# Patient Record
Sex: FEMALE | Race: WHITE | NOT HISPANIC OR LATINO | URBAN - METROPOLITAN AREA
[De-identification: names, ages, dates, MRNs, and addresses within clinical notes are randomized per-mention and may not be internally consistent; named-entity substitution may affect disease eponyms.]

---

## 2023-12-04 PROCEDURE — 87086 URINE CULTURE/COLONY COUNT: CPT

## 2023-12-04 PROCEDURE — 87186 SC STD MICRODIL/AGAR DIL: CPT

## 2023-12-05 ENCOUNTER — LAB REQUISITION (OUTPATIENT)
Dept: LAB | Facility: HOSPITAL | Age: 48
End: 2023-12-05
Payer: COMMERCIAL

## 2023-12-05 DIAGNOSIS — R39.198 OTHER DIFFICULTIES WITH MICTURITION: ICD-10-CM

## 2023-12-07 LAB — BACTERIA UR CULT: ABNORMAL

## 2024-02-23 DIAGNOSIS — Z76.0 MEDICATION REFILL: ICD-10-CM

## 2024-02-23 RX ORDER — HYDROXYZINE HYDROCHLORIDE 50 MG/1
50 TABLET, FILM COATED ORAL EVERY 12 HOURS PRN
COMMUNITY
End: 2024-02-23 | Stop reason: SDUPTHER

## 2024-02-23 RX ORDER — ESCITALOPRAM OXALATE 20 MG/1
20 TABLET ORAL DAILY
Qty: 90 TABLET | Refills: 1 | Status: SHIPPED | OUTPATIENT
Start: 2024-02-23 | End: 2024-02-29 | Stop reason: SDUPTHER

## 2024-02-23 RX ORDER — ALBUTEROL SULFATE 90 UG/1
2 AEROSOL, METERED RESPIRATORY (INHALATION) EVERY 4 HOURS PRN
COMMUNITY
End: 2024-02-23 | Stop reason: SDUPTHER

## 2024-02-23 RX ORDER — HYDROXYZINE HYDROCHLORIDE 50 MG/1
50 TABLET, FILM COATED ORAL EVERY 12 HOURS PRN
Qty: 90 TABLET | Refills: 1 | Status: SHIPPED | OUTPATIENT
Start: 2024-02-23 | End: 2024-02-29 | Stop reason: SDUPTHER

## 2024-02-23 RX ORDER — ALBUTEROL SULFATE 90 UG/1
2 AEROSOL, METERED RESPIRATORY (INHALATION) EVERY 4 HOURS PRN
Qty: 18 G | Refills: 2 | Status: SHIPPED | OUTPATIENT
Start: 2024-02-23 | End: 2024-02-29 | Stop reason: SDUPTHER

## 2024-02-23 RX ORDER — ESCITALOPRAM OXALATE 20 MG/1
20 TABLET ORAL DAILY
COMMUNITY
End: 2024-02-23 | Stop reason: SDUPTHER

## 2024-02-29 DIAGNOSIS — Z76.0 MEDICATION REFILL: ICD-10-CM

## 2024-02-29 RX ORDER — ALBUTEROL SULFATE 90 UG/1
2 AEROSOL, METERED RESPIRATORY (INHALATION) EVERY 4 HOURS PRN
Qty: 18 G | Refills: 2 | Status: SHIPPED | OUTPATIENT
Start: 2024-02-29 | End: 2024-03-01 | Stop reason: SDUPTHER

## 2024-02-29 RX ORDER — ESCITALOPRAM OXALATE 20 MG/1
20 TABLET ORAL DAILY
Qty: 90 TABLET | Refills: 1 | Status: SHIPPED | OUTPATIENT
Start: 2024-02-29 | End: 2024-03-01 | Stop reason: SDUPTHER

## 2024-02-29 RX ORDER — HYDROXYZINE HYDROCHLORIDE 50 MG/1
50 TABLET, FILM COATED ORAL EVERY 12 HOURS PRN
Qty: 90 TABLET | Refills: 1 | Status: SHIPPED | OUTPATIENT
Start: 2024-02-29 | End: 2024-03-01 | Stop reason: SDUPTHER

## 2024-02-29 NOTE — PROGRESS NOTES
This is a 48 year old female for EVAL of EMESIS and ABDO PAIN set up as TELE visit AND BEING TRIAGED BY STAFF NOW (day before) TO BE SEEN OR SENT TO ER  Phone call to patient to verify status of visit at 8:04 am unsuccessful    DENIES CP AND DENIES SOB    Phone CALL SUCCESSFUL AT 9:00 AM APPROX    SHE INDICATES SHE HAS ACID REFLUX AFTER DRINKING COFFEE SHE HAS EMESIS AND IS RECOMMENDED TO SCHEDULE AN IN OFFICE VISIT TO EXAMINE THE ABDOMENT      DENIES PREGNANCY AND STATES IN MENOPAUSE SINCE 2019      NO FEVER  NO BRBPR   NO MELENA     POS SMOKING HX 1/2 PPD AND URGED TO CEASE  POS FOR ETOH ADMITS TO DAILY USE OF BEER  NO USE OF ADVIL/ASA    She NEEDS AN IN OFFICE EVALUATION NEXT WEEK WEDNES OR FRIDAY AND WE HAVE ORDERED PRELIMINARY LABS

## 2024-02-29 NOTE — TELEPHONE ENCOUNTER
PT requesting refill on RX hydroxyzine, escitalopram, albuterol. Please send to [facility] in [place]

## 2024-03-01 ENCOUNTER — TELEMEDICINE (OUTPATIENT)
Dept: PRIMARY CARE | Facility: CLINIC | Age: 49
End: 2024-03-01
Payer: COMMERCIAL

## 2024-03-01 DIAGNOSIS — Z76.0 MEDICATION REFILL: ICD-10-CM

## 2024-03-01 DIAGNOSIS — K21.9 GASTROESOPHAGEAL REFLUX DISEASE, UNSPECIFIED WHETHER ESOPHAGITIS PRESENT: ICD-10-CM

## 2024-03-01 DIAGNOSIS — Z78.9 ALCOHOL USE: ICD-10-CM

## 2024-03-01 DIAGNOSIS — R10.9 ABDOMINAL PAIN, UNSPECIFIED ABDOMINAL LOCATION: ICD-10-CM

## 2024-03-01 DIAGNOSIS — Z12.12 SCREENING FOR COLORECTAL CANCER: ICD-10-CM

## 2024-03-01 DIAGNOSIS — R11.2 NAUSEA AND VOMITING, UNSPECIFIED VOMITING TYPE: Primary | ICD-10-CM

## 2024-03-01 DIAGNOSIS — K29.70 GASTRITIS WITHOUT BLEEDING, UNSPECIFIED CHRONICITY, UNSPECIFIED GASTRITIS TYPE: ICD-10-CM

## 2024-03-01 DIAGNOSIS — Z12.11 SCREENING FOR COLORECTAL CANCER: ICD-10-CM

## 2024-03-01 DIAGNOSIS — F17.200 TOBACCO USE DISORDER: ICD-10-CM

## 2024-03-01 PROCEDURE — 99442 PR PHYS/QHP TELEPHONE EVALUATION 11-20 MIN: CPT | Performed by: FAMILY MEDICINE

## 2024-03-01 RX ORDER — ALBUTEROL SULFATE 90 UG/1
2 AEROSOL, METERED RESPIRATORY (INHALATION) EVERY 4 HOURS PRN
Qty: 18 G | Refills: 2 | Status: SHIPPED | OUTPATIENT
Start: 2024-03-01 | End: 2024-03-06 | Stop reason: SDUPTHER

## 2024-03-01 RX ORDER — HYDROXYZINE HYDROCHLORIDE 50 MG/1
50 TABLET, FILM COATED ORAL EVERY 12 HOURS PRN
Qty: 90 TABLET | Refills: 1 | Status: SHIPPED | OUTPATIENT
Start: 2024-03-01 | End: 2024-03-06 | Stop reason: SDUPTHER

## 2024-03-01 RX ORDER — ESCITALOPRAM OXALATE 20 MG/1
20 TABLET ORAL DAILY
Qty: 90 TABLET | Refills: 1 | Status: SHIPPED | OUTPATIENT
Start: 2024-03-01 | End: 2024-03-18

## 2024-03-01 RX ORDER — ESOMEPRAZOLE MAGNESIUM 40 MG/1
40 CAPSULE, DELAYED RELEASE ORAL 2 TIMES DAILY
Qty: 60 CAPSULE | Refills: 1 | Status: SHIPPED | OUTPATIENT
Start: 2024-03-01 | End: 2024-04-30

## 2024-03-03 NOTE — PROGRESS NOTES
This is a 48 year old female who had a BRIEF TELE VISIT and determined that IN HOUSE OFFICE VISIT is needed for ABDO EXAM and further evaluation for N V and abdominal discomfort last week and called off work and duration of Sxs lasted 5 days and now entirely improved.    PAIN in ABDO was DURING EMESIS and on MUSCLE STRUCTURE of ABDo on LEfT SIDE     NO STOOL ISSUES just emesis   NO BRBPR   DO DIARRHEA        ROS is NEG for HEADACHE,  DIARRHEA, CHEST PAIN, SOB, and BLEEDING and as further REVIEWED BELOW.    Telly Flor is a 48 y.o. female who presents for Follow-up (Follow up on tele ).    HPI:    See above    Review of systems is essentially negative for all systems except for any identified issues in HPI above.    Objective     /72   Pulse 70   Temp 36.3 °C (97.4 °F)   SpO2 98%      Physical Examination:       GENERAL           General Appearance: well-appearing, well-developed, well-hydrated, well-nourished, no acute distress.        HEENT           NECK supple, no masses or thyromegaly, no carotid bruit.        EYES           Extraocular Movements: normal, bilateral eyes KWAME, no conjunctival injection.        HEART           Rate and Rhythm regular rate and rhythm. Heart sounds: normal S1S2, no S3 or S4. Murmurs: none.        CHEST           Breath sounds: Clear to IPPA, RR<16 no use of accessory muscles.        ABDOMEN           General: Neg for LKKS or masses, no scleral icterus or jaundice.        MUSCULOSKELETAL           Joints Demonstration: Neg for erythema, swelling or joint deformities. gross abnormalities no gross abnormalities.        EXTREMITIES           Lower Extremities: Neg for cyanosis, clubbing or edema.       Assessment/Plan   ENCOURAGED TO SCHEDULE ROUTINE MEDICAL    URGED TO CEASE SMOKING and CEASE ETOH use  She has now resumed work  CARE GAPS are added to labs for HIV and HEP C  She is deferring PNEUMONIA VAX at this time    Problem List Items Addressed This Visit     None  Visit Diagnoses       Nausea and vomiting, unspecified vomiting type    -  Primary    Abdominal pain, unspecified abdominal location        Gastroesophageal reflux disease, unspecified whether esophagitis present        Tobacco use disorder        Alcohol use        Screening for colorectal cancer        Gastritis without bleeding, unspecified chronicity, unspecified gastritis type                FOLLOW UP:  PRN and as specified above         Cristina Ledezma M.D.

## 2024-03-06 ENCOUNTER — OFFICE VISIT (OUTPATIENT)
Dept: PRIMARY CARE | Facility: CLINIC | Age: 49
End: 2024-03-06
Payer: COMMERCIAL

## 2024-03-06 VITALS
HEART RATE: 70 BPM | DIASTOLIC BLOOD PRESSURE: 72 MMHG | SYSTOLIC BLOOD PRESSURE: 122 MMHG | OXYGEN SATURATION: 98 % | TEMPERATURE: 97.4 F

## 2024-03-06 DIAGNOSIS — Z12.39 BREAST SCREENING: ICD-10-CM

## 2024-03-06 DIAGNOSIS — R11.2 NAUSEA AND VOMITING, UNSPECIFIED VOMITING TYPE: Primary | ICD-10-CM

## 2024-03-06 DIAGNOSIS — Z76.0 MEDICATION REFILL: ICD-10-CM

## 2024-03-06 DIAGNOSIS — K21.9 GASTROESOPHAGEAL REFLUX DISEASE, UNSPECIFIED WHETHER ESOPHAGITIS PRESENT: ICD-10-CM

## 2024-03-06 DIAGNOSIS — R10.9 ABDOMINAL PAIN, UNSPECIFIED ABDOMINAL LOCATION: ICD-10-CM

## 2024-03-06 DIAGNOSIS — Z12.12 SCREENING FOR COLORECTAL CANCER: ICD-10-CM

## 2024-03-06 DIAGNOSIS — Z13.9 SCREENING DUE: ICD-10-CM

## 2024-03-06 DIAGNOSIS — K29.70 GASTRITIS WITHOUT BLEEDING, UNSPECIFIED CHRONICITY, UNSPECIFIED GASTRITIS TYPE: ICD-10-CM

## 2024-03-06 DIAGNOSIS — Z12.11 SCREENING FOR COLORECTAL CANCER: ICD-10-CM

## 2024-03-06 DIAGNOSIS — Z78.9 ALCOHOL USE: ICD-10-CM

## 2024-03-06 DIAGNOSIS — F17.200 TOBACCO USE DISORDER: ICD-10-CM

## 2024-03-06 PROBLEM — I10 PRIMARY HYPERTENSION: Status: ACTIVE | Noted: 2024-03-06

## 2024-03-06 PROBLEM — N92.6 IRREGULAR PERIODS: Status: ACTIVE | Noted: 2024-03-06

## 2024-03-06 PROBLEM — E78.49 OTHER HYPERLIPIDEMIA: Status: ACTIVE | Noted: 2024-03-06

## 2024-03-06 PROBLEM — J44.9 CHRONIC OBSTRUCTIVE PULMONARY DISEASE (MULTI): Status: ACTIVE | Noted: 2024-03-06

## 2024-03-06 PROBLEM — F41.9 ANXIETY: Status: ACTIVE | Noted: 2024-03-06

## 2024-03-06 PROCEDURE — 3078F DIAST BP <80 MM HG: CPT | Performed by: FAMILY MEDICINE

## 2024-03-06 PROCEDURE — 3074F SYST BP LT 130 MM HG: CPT | Performed by: FAMILY MEDICINE

## 2024-03-06 PROCEDURE — 99214 OFFICE O/P EST MOD 30 MIN: CPT | Performed by: FAMILY MEDICINE

## 2024-03-06 RX ORDER — HYDROXYZINE HYDROCHLORIDE 50 MG/1
50 TABLET, FILM COATED ORAL EVERY 12 HOURS PRN
Qty: 90 TABLET | Refills: 1 | Status: SHIPPED | OUTPATIENT
Start: 2024-03-06

## 2024-03-06 RX ORDER — ALBUTEROL SULFATE 90 UG/1
2 AEROSOL, METERED RESPIRATORY (INHALATION) EVERY 4 HOURS PRN
Qty: 18 G | Refills: 2 | Status: SHIPPED | OUTPATIENT
Start: 2024-03-06

## 2024-03-06 ASSESSMENT — PATIENT HEALTH QUESTIONNAIRE - PHQ9
2. FEELING DOWN, DEPRESSED OR HOPELESS: NOT AT ALL
1. LITTLE INTEREST OR PLEASURE IN DOING THINGS: NOT AT ALL
SUM OF ALL RESPONSES TO PHQ9 QUESTIONS 1 AND 2: 0

## 2024-03-06 ASSESSMENT — PAIN SCALES - GENERAL: PAINLEVEL: 0-NO PAIN

## 2024-03-08 NOTE — PROGRESS NOTES
"  This is a 48 year old female for ROUTINE MEDICAL and she was PREVIOUSLY REFERRED TO GI for BOTH UPPER and LOWER SCOPES but absolutely declines and is not able to be talked into this despite awareness pf RISKS of UPPER GI and LOWER GI MALIGNANCIES  but she AGREES to have COLOGARD in any case.     URGED TO MAKE and KEEP the GI visit for UGI issues.      ROS is NEG for HEADACHE, NAUSEA, VOMITING, DIARRHEA, CHEST PAIN, SOB, and BLEEDING and as further REVIEWED BELOW.    Subjective   Charlie Flor is a 48 y.o. female who presents for Annual Exam.    HPI:    See above      Review of systems is essentially negative for all systems except for any identified issues in HPI above.    Objective     /70   Pulse 69   Temp 36.2 °C (97.2 °F)   Ht 1.702 m (5' 7\")   Wt 77.6 kg (171 lb)   SpO2 98%   BMI 26.78 kg/m²      COMPLETE PHYSICAL EXAM    GENERAL           General Appearance: pleasant, well-appearing, well-developed, well-hydrated, well-nourished, well-groomed, .        HEENT           NECK supple, Neg for adneopathy no thyroid enlargement or nodules, Oropharynx normal no exudates.        EYES           Pupils: PERRLA, no photophobia.        HEART           Rate and Rhythm regular rate and rhythm. Heart sounds: normal S1S2. Murmurs: none.        LUNGS           Effort: Normal chest wall, no pectus, Normal air entry all fields, SCATTERERED RHONCHI, RR<16 with no use of accessory muscles.        BREASTS           Bilaterally: no masses, no nipple retraction, no nipple discharge, no abnormal skin changes. Axilla: no lymphadenopathy.        BACK           General: unremarkable, no spinal tenderness or rashes.        ABDOMEN           General: Normal to inspection, neg for LKKS or masses and BSs heard in all quadrants           RECTAL is negative for masses and HEME NEGATIVE.        LYMPHATICS           Cervical: none. Axillary: none.        MUSCULOSKELETAL           gross abnormalities no gross abnormalities, no joint " redness or swelling.        EXTREMITIES           Varicose veins: not present. Pulses: 2+ bilateral. Clubbing: none. Cyanosis: no.        NEUROLOGICAL           Orientation: alert and oriented x 3. Grossly normal: yes. Plantars: downgoing bilaterally. Muscle Bulk: normal . Cranial Nerves: CN's II-XII grossly intact.        PSYCHOLOGY           Affect: appropriate. Mood: pleasant.     Assessment/Plan   COUNSELED on the wisdom of SMOKING CESSATION but not yet ready to do so.  Problem List Items Addressed This Visit    None  Visit Diagnoses       Routine medical exam    -  Primary    Relevant Orders    CBC    Comprehensive metabolic panel    Microscopic Only, Urine    Screening mammogram for breast cancer        Relevant Orders    BI mammo bilateral screening tomosynthesis    Screening for colorectal cancer        Screening, lipid        Relevant Orders    Lipid panel    Screening due        Health counseling        Immunization counseling        Tobacco use disorder        Alcohol use        Gastroesophageal reflux disease, unspecified whether esophagitis present        Gastritis without bleeding, unspecified chronicity, unspecified gastritis type        Nausea and vomiting, unspecified vomiting type                FOLLOW UP:   YEARLY for ROUTINE MEDICAL and PRN for other issues as discussed in visit         Cristina Ledezma M.D.

## 2024-03-17 DIAGNOSIS — Z76.0 MEDICATION REFILL: ICD-10-CM

## 2024-03-18 ENCOUNTER — TELEPHONE (OUTPATIENT)
Dept: PRIMARY CARE | Facility: CLINIC | Age: 49
End: 2024-03-18
Payer: COMMERCIAL

## 2024-03-18 RX ORDER — ESCITALOPRAM OXALATE 20 MG/1
20 TABLET ORAL DAILY
Qty: 90 TABLET | Refills: 0 | Status: SHIPPED | OUTPATIENT
Start: 2024-03-18

## 2024-03-18 NOTE — PROGRESS NOTES
This is a 48 year old female for FU for REVIEW of CONDITIONS and [facility] CURRENTLY OFF WORK for STOMACH ISSUES and has numerous referrals.  SHE RETURNED TO WORK March 2nd.    CURRENTLY NO ABDO PAIN set to see GI    Had been encouraged to QUIT SMOKING and cease ETOH  Was referred to GI      SHE RETURNS this week Friday for ROUTINE MEDICAL      ROS is NEG for HEADACHE, NAUSEA, VOMITING, DIARRHEA, CHEST PAIN, SOB, and BLEEDING and as further REVIEWED BELOW.    Telly Flor is a 48 y.o. female who presents for Follow-up ([facility] paper work).    HPI:    Per nursing intake, pt here for Follow-up ([facility] paper work)       Review of systems is essentially negative for all systems except for any identified issues in HPI above.    Objective     /80   Pulse 64   Temp 36.3 °C (97.4 °F) (Temporal)   Wt 67.8 kg (149 lb 6.4 oz)   SpO2 99%   BMI 23.40 kg/m²      Physical Examination:       GENERAL           General Appearance: well-appearing, well-developed, well-hydrated, well-nourished, no acute distress.        HEENT           NECK supple, no masses or thyromegaly, no carotid bruit.        EYES           Extraocular Movements: normal, bilateral eyes KWAME, no conjunctival injection.        HEART           Rate and Rhythm regular rate and rhythm. Heart sounds: normal S1S2, no S3 or S4. Murmurs: none.        CHEST           Breath sounds: Clear to IPPA, RR<16 no use of accessory muscles.        ABDOMEN           General: Neg for LKKS or masses, no scleral icterus or jaundice.        MUSCULOSKELETAL           Joints Demonstration: Neg for erythema, swelling or joint deformities. gross abnormalities no gross abnormalities.        EXTREMITIES           Lower Extremities: Neg for cyanosis, clubbing or edema.       Assessment/Plan   Problem List Items Addressed This Visit    None  Visit Diagnoses       Gastritis without bleeding, unspecified chronicity, unspecified gastritis type    -  Primary    Alcohol use         Abdominal pain, unspecified abdominal location        Gastroesophageal reflux disease, unspecified whether esophagitis present                FOLLOW UP:  PRN and as specified above         Cristina Ledezma M.D.

## 2024-03-20 ENCOUNTER — OFFICE VISIT (OUTPATIENT)
Dept: PRIMARY CARE | Facility: CLINIC | Age: 49
End: 2024-03-20
Payer: COMMERCIAL

## 2024-03-20 VITALS
HEART RATE: 64 BPM | TEMPERATURE: 97.4 F | DIASTOLIC BLOOD PRESSURE: 80 MMHG | WEIGHT: 149.4 LBS | OXYGEN SATURATION: 99 % | SYSTOLIC BLOOD PRESSURE: 112 MMHG | BODY MASS INDEX: 23.4 KG/M2

## 2024-03-20 DIAGNOSIS — K21.9 GASTROESOPHAGEAL REFLUX DISEASE, UNSPECIFIED WHETHER ESOPHAGITIS PRESENT: ICD-10-CM

## 2024-03-20 DIAGNOSIS — K29.70 GASTRITIS WITHOUT BLEEDING, UNSPECIFIED CHRONICITY, UNSPECIFIED GASTRITIS TYPE: Primary | ICD-10-CM

## 2024-03-20 DIAGNOSIS — Z78.9 ALCOHOL USE: ICD-10-CM

## 2024-03-20 DIAGNOSIS — R10.9 ABDOMINAL PAIN, UNSPECIFIED ABDOMINAL LOCATION: ICD-10-CM

## 2024-03-20 PROCEDURE — 3074F SYST BP LT 130 MM HG: CPT | Performed by: FAMILY MEDICINE

## 2024-03-20 PROCEDURE — 3079F DIAST BP 80-89 MM HG: CPT | Performed by: FAMILY MEDICINE

## 2024-03-20 PROCEDURE — 99213 OFFICE O/P EST LOW 20 MIN: CPT | Performed by: FAMILY MEDICINE

## 2024-03-20 ASSESSMENT — PATIENT HEALTH QUESTIONNAIRE - PHQ9
1. LITTLE INTEREST OR PLEASURE IN DOING THINGS: NOT AT ALL
2. FEELING DOWN, DEPRESSED OR HOPELESS: NOT AT ALL
SUM OF ALL RESPONSES TO PHQ9 QUESTIONS 1 AND 2: 0

## 2024-03-22 ENCOUNTER — OFFICE VISIT (OUTPATIENT)
Dept: PRIMARY CARE | Facility: CLINIC | Age: 49
End: 2024-03-22
Payer: COMMERCIAL

## 2024-03-22 VITALS
OXYGEN SATURATION: 98 % | HEIGHT: 67 IN | SYSTOLIC BLOOD PRESSURE: 118 MMHG | WEIGHT: 171 LBS | DIASTOLIC BLOOD PRESSURE: 70 MMHG | HEART RATE: 69 BPM | BODY MASS INDEX: 26.84 KG/M2 | TEMPERATURE: 97.2 F

## 2024-03-22 DIAGNOSIS — Z13.9 SCREENING DUE: ICD-10-CM

## 2024-03-22 DIAGNOSIS — Z78.9 ALCOHOL USE: ICD-10-CM

## 2024-03-22 DIAGNOSIS — R11.2 NAUSEA AND VOMITING, UNSPECIFIED VOMITING TYPE: ICD-10-CM

## 2024-03-22 DIAGNOSIS — R09.89 RHONCHI: ICD-10-CM

## 2024-03-22 DIAGNOSIS — Z12.12 SCREENING FOR COLORECTAL CANCER: ICD-10-CM

## 2024-03-22 DIAGNOSIS — K21.9 GASTROESOPHAGEAL REFLUX DISEASE, UNSPECIFIED WHETHER ESOPHAGITIS PRESENT: ICD-10-CM

## 2024-03-22 DIAGNOSIS — Z00.00 ROUTINE MEDICAL EXAM: Primary | ICD-10-CM

## 2024-03-22 DIAGNOSIS — K29.70 GASTRITIS WITHOUT BLEEDING, UNSPECIFIED CHRONICITY, UNSPECIFIED GASTRITIS TYPE: ICD-10-CM

## 2024-03-22 DIAGNOSIS — Z71.9 HEALTH COUNSELING: ICD-10-CM

## 2024-03-22 DIAGNOSIS — Z13.220 SCREENING, LIPID: ICD-10-CM

## 2024-03-22 DIAGNOSIS — Z12.31 SCREENING MAMMOGRAM FOR BREAST CANCER: ICD-10-CM

## 2024-03-22 DIAGNOSIS — Z71.85 IMMUNIZATION COUNSELING: ICD-10-CM

## 2024-03-22 DIAGNOSIS — Z12.11 SCREENING FOR COLORECTAL CANCER: ICD-10-CM

## 2024-03-22 DIAGNOSIS — F17.200 TOBACCO USE DISORDER: ICD-10-CM

## 2024-03-22 PROCEDURE — 3078F DIAST BP <80 MM HG: CPT | Performed by: FAMILY MEDICINE

## 2024-03-22 PROCEDURE — 99396 PREV VISIT EST AGE 40-64: CPT | Performed by: FAMILY MEDICINE

## 2024-03-22 PROCEDURE — 3074F SYST BP LT 130 MM HG: CPT | Performed by: FAMILY MEDICINE

## 2024-03-22 RX ORDER — ALBUTEROL SULFATE 0.83 MG/ML
2.5 SOLUTION RESPIRATORY (INHALATION)
COMMUNITY

## 2024-03-22 ASSESSMENT — PAIN SCALES - GENERAL: PAINLEVEL: 0-NO PAIN

## 2024-03-27 ENCOUNTER — TELEPHONE (OUTPATIENT)
Dept: PRIMARY CARE | Facility: CLINIC | Age: 49
End: 2024-03-27
Payer: COMMERCIAL

## 2024-03-27 NOTE — TELEPHONE ENCOUNTER
Pt states [place] paperwork was not signed on page 3, pt states it needs to be completed today as it is already overdue, please call patient at [phone], ok to leave message.

## 2024-03-27 NOTE — TELEPHONE ENCOUNTER
PT calling back to check the status of paperwork that needs completed.  PT requesting completed paperwork be faxed once done.  Please call PT when completed and faxed.  OK to leave message.

## 2024-06-26 DIAGNOSIS — Z76.0 MEDICATION REFILL: ICD-10-CM

## 2024-06-26 RX ORDER — ALBUTEROL SULFATE 90 UG/1
2 AEROSOL, METERED RESPIRATORY (INHALATION) EVERY 4 HOURS PRN
Qty: 18 G | Refills: 2 | Status: SHIPPED | OUTPATIENT
Start: 2024-06-26

## 2024-06-26 RX ORDER — ESCITALOPRAM OXALATE 20 MG/1
20 TABLET ORAL DAILY
Qty: 90 TABLET | Refills: 0 | Status: SHIPPED | OUTPATIENT
Start: 2024-06-26

## 2024-06-26 RX ORDER — HYDROXYZINE HYDROCHLORIDE 50 MG/1
50 TABLET, FILM COATED ORAL EVERY 12 HOURS PRN
Qty: 90 TABLET | Refills: 1 | Status: SHIPPED | OUTPATIENT
Start: 2024-06-26

## 2024-06-26 NOTE — TELEPHONE ENCOUNTER
Pt lvm stating she had ppw faxed over from [employer]. Pt states she needs this paper filled out and faxed back before the 29th. Pt is also requesting refill but did not state which rx needing refills.

## 2024-06-28 ENCOUNTER — TELEPHONE (OUTPATIENT)
Dept: PRIMARY CARE | Facility: CLINIC | Age: 49
End: 2024-06-28
Payer: COMMERCIAL

## 2024-06-28 NOTE — TELEPHONE ENCOUNTER
PT states she needs updated FMLA forms. PT states that [employer] told her they could use the old forms, but just adjust the dates. PT offered appointment to complete paperwork for next available (7/10/2024), but PT refused, as she states it is due to [employer] by 6/30/2024. PT requesting a call back stating it is urgent. Please call [phone]

## 2024-06-28 NOTE — TELEPHONE ENCOUNTER
Patient called back, states she had an episode on 6/10/2024 and went back to work on 6/13/2024. Needs dates updated to reflect this. Paperwork updated and was initialed by PCP. Paperwork updated and scanned/faxed. Pt notified and wishes to have copy mailed to her home address. Paperwork placed in the mail.

## 2024-07-05 DIAGNOSIS — Z76.0 MEDICATION REFILL: ICD-10-CM

## 2024-07-05 RX ORDER — ESCITALOPRAM OXALATE 20 MG/1
20 TABLET ORAL DAILY
Qty: 90 TABLET | Refills: 0 | Status: SHIPPED | OUTPATIENT
Start: 2024-07-05

## 2024-08-29 DIAGNOSIS — Z76.0 MEDICATION REFILL: ICD-10-CM

## 2024-08-29 RX ORDER — HYDROXYZINE HYDROCHLORIDE 50 MG/1
50 TABLET, FILM COATED ORAL EVERY 12 HOURS PRN
Qty: 90 TABLET | Refills: 0 | Status: SHIPPED | OUTPATIENT
Start: 2024-08-29

## 2024-08-29 RX ORDER — ESCITALOPRAM OXALATE 20 MG/1
20 TABLET ORAL DAILY
Qty: 90 TABLET | Refills: 0 | Status: SHIPPED | OUTPATIENT
Start: 2024-08-29

## 2024-08-29 NOTE — TELEPHONE ENCOUNTER
PT REQUESTING REFILL HYDROXAZINE 50 ESCALOPRAM 20 SENT TO [facility] PT IS SCHEDULED WITH DR SCOTT FOR NPV

## 2024-08-29 NOTE — TELEPHONE ENCOUNTER
Requesting refill on populated rx - last OV 3/22/2024 with Dr. Ledezma.  Scheduled to see Dr. Patterson 10/2/2024.

## 2024-09-18 ENCOUNTER — TELEPHONE (OUTPATIENT)
Dept: PRIMARY CARE | Facility: CLINIC | Age: 49
End: 2024-09-18
Payer: COMMERCIAL

## 2024-09-18 NOTE — TELEPHONE ENCOUNTER
LM for her to call us back to confirm reason for NPV with Dr Patterson - she had Physical in march - she is not due back until march unless she has a concern and needs to be seen sooner.

## 2024-09-24 NOTE — TELEPHONE ENCOUNTER
Appointment canceled. PT states she does not have a reason for the appointment, and will call to schedule if she needs anything before March.

## 2024-10-02 ENCOUNTER — APPOINTMENT (OUTPATIENT)
Dept: PRIMARY CARE | Facility: CLINIC | Age: 49
End: 2024-10-02
Payer: COMMERCIAL

## 2025-03-20 DIAGNOSIS — Z76.0 MEDICATION REFILL: ICD-10-CM

## 2025-03-20 DIAGNOSIS — J44.9 CHRONIC OBSTRUCTIVE PULMONARY DISEASE, UNSPECIFIED COPD TYPE (MULTI): ICD-10-CM

## 2025-03-20 RX ORDER — ESCITALOPRAM OXALATE 20 MG/1
20 TABLET ORAL DAILY
Qty: 30 TABLET | Refills: 0 | Status: SHIPPED | OUTPATIENT
Start: 2025-03-20 | End: 2025-04-01 | Stop reason: ALTCHOICE

## 2025-04-01 ENCOUNTER — OFFICE VISIT (OUTPATIENT)
Dept: PRIMARY CARE | Facility: CLINIC | Age: 50
End: 2025-04-01
Payer: COMMERCIAL

## 2025-04-01 VITALS
BODY MASS INDEX: 24.48 KG/M2 | HEIGHT: 67 IN | TEMPERATURE: 97.8 F | HEART RATE: 71 BPM | SYSTOLIC BLOOD PRESSURE: 114 MMHG | OXYGEN SATURATION: 99 % | WEIGHT: 156 LBS | DIASTOLIC BLOOD PRESSURE: 80 MMHG

## 2025-04-01 DIAGNOSIS — Z00.00 ANNUAL PHYSICAL EXAM: ICD-10-CM

## 2025-04-01 DIAGNOSIS — F17.200 TOBACCO USE DISORDER: ICD-10-CM

## 2025-04-01 DIAGNOSIS — Z13.1 SCREENING FOR DIABETES MELLITUS: ICD-10-CM

## 2025-04-01 DIAGNOSIS — I10 PRIMARY HYPERTENSION: ICD-10-CM

## 2025-04-01 DIAGNOSIS — Z76.0 MEDICATION REFILL: ICD-10-CM

## 2025-04-01 DIAGNOSIS — Z11.4 SCREENING FOR HIV (HUMAN IMMUNODEFICIENCY VIRUS): ICD-10-CM

## 2025-04-01 DIAGNOSIS — J44.9 CHRONIC OBSTRUCTIVE PULMONARY DISEASE, UNSPECIFIED COPD TYPE (MULTI): ICD-10-CM

## 2025-04-01 DIAGNOSIS — E78.49 OTHER HYPERLIPIDEMIA: ICD-10-CM

## 2025-04-01 DIAGNOSIS — F41.9 ANXIETY: ICD-10-CM

## 2025-04-01 DIAGNOSIS — R74.01 TRANSAMINITIS: ICD-10-CM

## 2025-04-01 DIAGNOSIS — Z11.59 NEED FOR HEPATITIS C SCREENING TEST: ICD-10-CM

## 2025-04-01 DIAGNOSIS — F10.10 ALCOHOL ABUSE: Primary | ICD-10-CM

## 2025-04-01 PROCEDURE — 3079F DIAST BP 80-89 MM HG: CPT | Performed by: PHYSICIAN ASSISTANT

## 2025-04-01 PROCEDURE — 90750 HZV VACC RECOMBINANT IM: CPT | Performed by: PHYSICIAN ASSISTANT

## 2025-04-01 PROCEDURE — 3008F BODY MASS INDEX DOCD: CPT | Performed by: PHYSICIAN ASSISTANT

## 2025-04-01 PROCEDURE — 90471 IMMUNIZATION ADMIN: CPT | Performed by: PHYSICIAN ASSISTANT

## 2025-04-01 PROCEDURE — 99396 PREV VISIT EST AGE 40-64: CPT | Performed by: PHYSICIAN ASSISTANT

## 2025-04-01 PROCEDURE — 3074F SYST BP LT 130 MM HG: CPT | Performed by: PHYSICIAN ASSISTANT

## 2025-04-01 PROCEDURE — 99214 OFFICE O/P EST MOD 30 MIN: CPT | Performed by: PHYSICIAN ASSISTANT

## 2025-04-01 RX ORDER — PANTOPRAZOLE SODIUM 40 MG/1
40 TABLET, DELAYED RELEASE ORAL DAILY
Qty: 90 TABLET | Refills: 3 | Status: SHIPPED | OUTPATIENT
Start: 2025-04-01 | End: 2026-03-27

## 2025-04-01 RX ORDER — ALBUTEROL SULFATE 0.83 MG/ML
2.5 SOLUTION RESPIRATORY (INHALATION) EVERY 6 HOURS PRN
Qty: 75 ML | Refills: 1 | Status: SHIPPED | OUTPATIENT
Start: 2025-04-01 | End: 2025-04-01 | Stop reason: SDUPTHER

## 2025-04-01 RX ORDER — ALBUTEROL SULFATE 90 UG/1
2 INHALANT RESPIRATORY (INHALATION) EVERY 4 HOURS PRN
Qty: 8 G | Refills: 5 | Status: SHIPPED | OUTPATIENT
Start: 2025-04-01 | End: 2026-04-01

## 2025-04-01 RX ORDER — FLUTICASONE PROPIONATE AND SALMETEROL 100; 50 UG/1; UG/1
1 POWDER RESPIRATORY (INHALATION)
Qty: 60 EACH | Refills: 5 | Status: SHIPPED | OUTPATIENT
Start: 2025-04-01 | End: 2026-04-01

## 2025-04-01 RX ORDER — ALBUTEROL SULFATE 0.83 MG/ML
2.5 SOLUTION RESPIRATORY (INHALATION) EVERY 6 HOURS PRN
Qty: 75 ML | Refills: 1 | Status: SHIPPED | OUTPATIENT
Start: 2025-04-01 | End: 2025-05-01

## 2025-04-01 RX ORDER — HYDROXYZINE PAMOATE 50 MG/1
50 CAPSULE ORAL 2 TIMES DAILY PRN
Qty: 180 CAPSULE | Refills: 3 | Status: SHIPPED | OUTPATIENT
Start: 2025-04-01 | End: 2026-03-27

## 2025-04-01 RX ORDER — PEN NEEDLE, DIABETIC 31 GX5/16"
1 NEEDLE, DISPOSABLE MISCELLANEOUS DAILY
Qty: 10 EACH | Refills: 0 | Status: SHIPPED | OUTPATIENT
Start: 2025-04-01

## 2025-04-01 RX ORDER — ESCITALOPRAM OXALATE 20 MG/1
20 TABLET ORAL DAILY
Qty: 90 TABLET | Refills: 3 | Status: SHIPPED | OUTPATIENT
Start: 2025-04-01 | End: 2026-03-27

## 2025-04-01 RX ORDER — ALBUTEROL SULFATE 90 UG/1
2 INHALANT RESPIRATORY (INHALATION) EVERY 4 HOURS PRN
Qty: 8 G | Refills: 5 | Status: CANCELLED | OUTPATIENT
Start: 2025-04-01 | End: 2026-04-01

## 2025-04-01 ASSESSMENT — ENCOUNTER SYMPTOMS
CARDIOVASCULAR NEGATIVE: 1
CHEST TIGHTNESS: 0
WHEEZING: 1
COUGH: 0
ENDOCRINE NEGATIVE: 1
GASTROINTESTINAL NEGATIVE: 1
STRIDOR: 0
ALLERGIC/IMMUNOLOGIC NEGATIVE: 1
SHORTNESS OF BREATH: 1
DEPRESSION: 0
APNEA: 0
NEUROLOGICAL NEGATIVE: 1
NERVOUS/ANXIOUS: 1
CHOKING: 0
LOSS OF SENSATION IN FEET: 0
MUSCULOSKELETAL NEGATIVE: 1
CONSTITUTIONAL NEGATIVE: 1
OCCASIONAL FEELINGS OF UNSTEADINESS: 0
HEMATOLOGIC/LYMPHATIC NEGATIVE: 1
EYES NEGATIVE: 1

## 2025-04-01 ASSESSMENT — PATIENT HEALTH QUESTIONNAIRE - PHQ9
2. FEELING DOWN, DEPRESSED OR HOPELESS: NOT AT ALL
SUM OF ALL RESPONSES TO PHQ9 QUESTIONS 1 AND 2: 0
1. LITTLE INTEREST OR PLEASURE IN DOING THINGS: NOT AT ALL

## 2025-04-01 ASSESSMENT — PAIN SCALES - GENERAL: PAINLEVEL_OUTOF10: 0-NO PAIN

## 2025-04-01 ASSESSMENT — COLUMBIA-SUICIDE SEVERITY RATING SCALE - C-SSRS
2. HAVE YOU ACTUALLY HAD ANY THOUGHTS OF KILLING YOURSELF?: NO
6. HAVE YOU EVER DONE ANYTHING, STARTED TO DO ANYTHING, OR PREPARED TO DO ANYTHING TO END YOUR LIFE?: NO
1. IN THE PAST MONTH, HAVE YOU WISHED YOU WERE DEAD OR WISHED YOU COULD GO TO SLEEP AND NOT WAKE UP?: NO

## 2025-04-01 NOTE — TELEPHONE ENCOUNTER
Pt needed the script for the nebulizer sent to [facility] as they have it in stock. [facility] does not have it. Please send to the [facility] 20, pended thank you

## 2025-04-01 NOTE — ASSESSMENT & PLAN NOTE
- SPO2 @ 99 % at rest, declines to 94 % on RA with exertion (walking test)  -Has not seen a pulmonologist, patient declined seeing a pulmonologist.  Has been using albuterol both nebulizer and handheld for management of symptoms.  Chronically short of breath, requesting work note not use stairs.  Reports she is tried Symbicort in the past with minimal success.  Will try Advair.  Patient will let me know in 2 weeks how her symptoms are.  Encourage patient to think about seeing a pulmonologist.  I do not believe her symptoms to be cardiac in nature at this time however if she develops any chest pain, worsening shortness of breath, chest pressure, palpitations, ETC she been informed to go to the emergency department and we will have her follow-up after discharge with cardiology. She declines seeing cardiology ahead of time and did not want an EKG today    Orders:    nebulizers misc; 1 each once daily. Tubing and mask    albuterol (Ventolin HFA) 90 mcg/actuation inhaler; Inhale 2 puffs every 4 hours if needed for wheezing or shortness of breath.    albuterol 2.5 mg /3 mL (0.083 %) nebulizer solution; Take 3 mL (2.5 mg) by nebulization every 6 hours if needed for wheezing or shortness of breath.    fluticasone propion-salmeteroL (Advair Diskus) 100-50 mcg/dose diskus inhaler; Inhale 1 puff 2 times a day. Rinse mouth with water after use to reduce aftertaste and incidence of candidiasis. Do not swallow.

## 2025-04-01 NOTE — PATIENT INSTRUCTIONS
Follow up in two weeks by Prabha in two weeks to let me know how the new COPD medication is doing    Follow up in six months.    Emergency Department for any worsening symptoms.

## 2025-04-01 NOTE — LETTER
April 1, 2025     Patient: Charlie Flor   YOB: 1975   Date of Visit: 4/1/2025       To Whom It May Concern:    Charlie Flor was seen in my clinic on 4/1/2025.     Medical Recommendations/Work Status:  Patient may work but with the follow restrictions: Unable to use stairs, allow for elevator access.     If you have any questions or concerns, please don't hesitate to call. [phone]         Sincerely,         Francesco Lucas PA-C

## 2025-04-01 NOTE — TELEPHONE ENCOUNTER
Pt sharonm stating her nebulizer vial for the machine is carried at her pharmacy asking for a call back to send at a different pharmacy

## 2025-04-01 NOTE — PROGRESS NOTES
"Subjective     Patient ID: Charlie Flor is a 49 y.o. female who presents for Annual Exam (Recently in Urgent care then went to [place] [facility]), Establish Care, and Med Refill (Needs Rx for tubing aka mask for nebulizer machine. Pt also needs med refills.  Pt requesting note for work that says she doesn't have to walk the stairs. Currently \"pass\" expires 4/21 per pt. ).    Med Refill  Pertinent negatives include no coughing.     Charlie Flor is seen for her chronic issues.      Chronic alcohol abuse/transaminitis  -Patient reports several year history of chronic alcohol abuse, was seen to be drunk in the emergency department on March 15, 2025, combined with a GI virus.  Alcohol level was elevated in the 400s at that time.  She reports has not had any alcohol since that time.  She reports that she is in alcohol recovery program.  Patient denies any other drug abuse, other than tobacco use disorder.  -Check vitamin B1 level.  Encouraged over-the-counter vitamin B1 daily.  -Patient denies any skin itching, headache, nausea or vomiting, jaundice, confusion, sleep disturbance, uncontrolled movements, seizure-like activity.  -CIWA score is 1    Tobacco use disorder  -Counseled on negative health effects of tobacco, patient declines any treatment for this at this time.    History of gastritis  -Likely secondary to alcohol abuse, patient declines colonoscopy/EGD.  She denies of vomiting up any blood.  She denies any right upper quadrant discomfort.  She did have transaminitis    Anxiety  -Patient utilizes Lexapro 20 mg, and hydroxyzine 50 mg twice daily as needed.  She states that she is happy and content where her current medications are at.  She denies any HI/SI.    COPD  -Has not seen a pulmonologist, patient declined seeing a pulmonologist.  Has been using albuterol both nebulizer and handheld for management of symptoms.  Chronically short of breath, requesting work note not use stairs.  Reports she is tried Symbicort in " "the past with minimal success.  Will try Advair.  Patient will let me know in 2 weeks how her symptoms are.  Encourage patient to think about seeing a pulmonologist.  I do not believe her symptoms to be cardiac in nature at this time however if she develops any chest pain, worsening shortness of breath, chest pressure, palpitations, ETC she been informed to go to the emergency department and we will have her follow-up after discharge with cardiology.    History of hypertension  Not on any medications, blood pressures currently maintained at a good level.  Possibly secondary to past anxiety.    Annual physical  -Patient declines mammogram, cervical testing at this time.  She denies wanting to see an OB/GYN at this time.  -Patient declines colonoscopy.  -Patient declines antibody testing for hepatitis B, MMR, E TC.  -Patient accepts HIV/hepatitis C screening.  -Patient declines lung cancer screening at this time.  -Patient declines COVID/flu vaccination.  Patient accepts Shingrix vaccine.        Review of Systems   Constitutional: Negative.    HENT: Negative.     Eyes: Negative.    Respiratory:  Positive for shortness of breath and wheezing. Negative for apnea, cough, choking, chest tightness and stridor.    Cardiovascular: Negative.    Gastrointestinal: Negative.    Endocrine: Negative.    Genitourinary: Negative.    Musculoskeletal: Negative.    Skin: Negative.    Allergic/Immunologic: Negative.    Neurological: Negative.    Hematological: Negative.    Psychiatric/Behavioral:  The patient is nervous/anxious.        Objective  Vitals:  /80   Pulse 71   Temp 36.6 °C (97.8 °F)   Ht 1.702 m (5' 7\")   Wt 70.8 kg (156 lb)   SpO2 99%   BMI 24.43 kg/m²     Physical Exam  Vitals and nursing note reviewed.   Constitutional:       General: She is not in acute distress.     Appearance: She is not ill-appearing, toxic-appearing or diaphoretic.      Comments: Appears anxious. No alcohol smell present     HENT:      " Head: Normocephalic and atraumatic.      Right Ear: Tympanic membrane, ear canal and external ear normal. There is no impacted cerumen.      Left Ear: Tympanic membrane, ear canal and external ear normal. There is no impacted cerumen.      Nose: Nose normal. No congestion or rhinorrhea.      Mouth/Throat:      Mouth: Mucous membranes are moist.      Pharynx: No oropharyngeal exudate or posterior oropharyngeal erythema.   Eyes:      General: No scleral icterus.        Right eye: No discharge.         Left eye: No discharge.      Extraocular Movements: Extraocular movements intact.      Pupils: Pupils are equal, round, and reactive to light.   Neck:      Vascular: No carotid bruit.   Cardiovascular:      Rate and Rhythm: Normal rate and regular rhythm.      Pulses: Normal pulses.      Heart sounds: Normal heart sounds.   Pulmonary:      Effort: No respiratory distress.      Breath sounds: No stridor. Wheezing present. No rhonchi or rales.   Chest:      Chest wall: No tenderness.   Abdominal:      General: Bowel sounds are normal.      Palpations: Abdomen is soft.   Musculoskeletal:         General: No swelling, tenderness, deformity or signs of injury. Normal range of motion.      Cervical back: Normal range of motion. No rigidity or tenderness.      Right lower leg: No edema.      Left lower leg: No edema.   Lymphadenopathy:      Cervical: No cervical adenopathy.   Skin:     General: Skin is warm.      Capillary Refill: Capillary refill takes less than 2 seconds.      Coloration: Skin is not jaundiced or pale.      Findings: No bruising, erythema, lesion or rash.   Neurological:      General: No focal deficit present.      Mental Status: She is alert and oriented to person, place, and time.      Cranial Nerves: No cranial nerve deficit.      Sensory: No sensory deficit.      Motor: No weakness.      Coordination: Coordination normal.      Gait: Gait normal.      Deep Tendon Reflexes: Reflexes normal.   Psychiatric:          Mood and Affect: Mood normal.         Behavior: Behavior normal.         Thought Content: Thought content normal.         COMPLETE BLOOD COUNT AND DIFFERENTIAL  Order: [id number]  Component  Ref Range & Units 13 d ago   WBC  3.70 - 11.00 k/uL 9.26   RBC  3.90 - 5.20 m/uL 4.81   Hemoglobin  11.5 - 15.5 g/dL 15.3   Hematocrit  36.0 - 46.0 % 43.2   MCV  80.0 - 100.0 fL 89.8   MCH  26.0 - 34.0 pg 31.8   MCHC  30.5 - 36.0 g/dL 35.4   RDW-CV  11.5 - 15.0 % 12.5   Platelet Count  150 - 400 k/uL 151   MPV  9.0 - 12.7 fL 9.9   Neutrophils %  % 67.4   Abs Neut  1.45 - 7.50 k/uL 6.24   Lymphocytes %  % 23   Abs Lymph  1.00 - 4.00 k/uL 2.13   Monocytes %  % 7.1   Abs Mono  <0.87 k/uL 0.66   Eosinophils %  % 1.5   Abs Eosin  <0.46 k/uL 0.14   Basophils %  % 0.8   Abs Baso  <0.11 k/uL 0.07   Immature Granulocytes %  % 0.2   Abs Immature Gran  <0.10 k/uL <0.03   NRBC  /100 WBC 0   Absolute nRBC  <0.01 k/uL <0.01   Diff Type Auto     CXR 3/15/25  COMPREHENSIVE METABOLIC PANEL  Order: [id number]  Component  Ref Range & Units 13 d ago   Protein, Total  6.3 - 8.0 g/dL 8.5 High    Albumin  3.9 - 4.9 g/dL 4.8   Calcium, Total  8.5 - 10.2 mg/dL 10.7 High    Bilirubin, Total  0.2 - 1.3 mg/dL 1.3   Alkaline Phosphatase  34 - 123 U/L 98   AST  13 - 35 U/L 143 High    ALT  7 - 38 U/L 115 High    Glucose  74 - 99 mg/dL 113 High    Comment: The American Diabetes Association (ADA) provides guidance for cutoff values for fasting glucose and random glucose. The ADA defines fasting as no caloric intake for at least 8 hours. Fasting plasma glucose results between 100 to 125 mg/dL indicate increased risk for diabetes (prediabetes).  Fasting plasma glucose results greater than or equal to 126 mg/dL meet the criteria for diagnosis of diabetes. In the absence of unequivocal hyperglycemia, results should be confirmed by repeat testing. In a patient with classic symptoms of hyperglycemia or hyperglycemic crisis, random plasma glucose results greater  "than or equal to 200 mg/dL meet the criteria for diagnosis of diabetes.  Reference: Standards of Medical Care in Diabetes 2016, American Diabetes Association. Diabetes Care. 2016.39(Suppl 1).   BUN  7 - 21 mg/dL 22 High    Creatinine  0.58 - 0.96 mg/dL 0.62   Sodium  136 - 144 mmol/L 130 Low    Potassium  3.7 - 5.1 mmol/L 3.7   Chloride  98 - 107 mmol/L 88 Low    CO2  22 - 30 mmol/L 21 Low    Anion Gap  8 - 15 mmol/L 21 High    Estimated Glomerular Filtration Rate  >=60 mL/min/1.73m² 109       LIPASE  Order: [id number]  Component  Ref Range & Units 13 d ago       Lipase  16 - 61 U/L 31     \"HIGH SENSITIVITY TROPONIN T  Order: [id number]  Component  Ref Range & Units 13 d ago   YVAN High Sensitivity  <12 ng/L <6       RESULT:    Lines, tubes, and devices:  None.    Lungs and pleura:  There is no evidence of infiltrate, pleural effusion  or pneumothorax.    Cardiomediastinal silhouette:  Normal cardiomediastinal silhouette.    Bones and soft tissues:  Unremarkable.    Exam End: 03/19/25 13:40   \"    MAGNESIUM  Order: [id number]  Component  Ref Range & Units 13 d ago   Magnesium  1.7 - 2.3 mg/dL 1.7       NT PRO BNP  Order: [id number]  Component  Ref Range & Units 2 wk ago   NT Pro BNP  <125 pg/mL 48        Contains abnormal data ETHANOL/ALCOHOL  Order: [id number]  Component  Ref Range & Units 2 wk ago   Ethanol  <11 mg/dL 412 High    Comment: Values > 80 mg/dL may indicate intoxication       CREATINE KINASE/CK  Order: [id number]  Component  Ref Range & Units 2 wk ago   CK  42 - 196 U/L 93     Assessment/Plan   Assessment & Plan  Alcohol abuse  Pt denies current alcohol consumption and/or alcohol withdrawal symptoms. Pt denies any abdominal pain/jaundice. Physical exam without evidence of jaundice or RUQ pain. Transaminitis noted during ER visit, mild. Noted to have elevated alcohol levels at that time. Repeat labs, consider RUQ ultrasound if abnormal. Pt reports she is in an alcohol treatment program (she wont specify which " one). She declines any additional intervention. Will check labs. Take OTC vitamin b1 supplement daily. Restart PPI for gastric protection. Screen for hepatitis C/HIV. Pt declined hepatitis A/B testing.  Orders:    Comprehensive metabolic panel; Future    Gamma GT; Future    Ethanol; Future    Vitamin B1, Whole Blood; Future    pantoprazole (ProtoNix) 40 mg EC tablet; Take 1 tablet (40 mg) by mouth once daily. Do not crush, chew, or split.    Anxiety  Controlled per patient. Denies SI/HI. Refill meds. Check TSH  Orders:    Tsh With Reflex To Free T4 If Abnormal; Future    escitalopram (Lexapro) 20 mg tablet; Take 1 tablet (20 mg) by mouth once daily.    hydrOXYzine pamoate (VistariL) 50 mg capsule; Take 1 capsule (50 mg) by mouth 2 times a day as needed for anxiety.    Chronic obstructive pulmonary disease, unspecified COPD type (Multi)  - SPO2 @ 99 % at rest, declines to 94 % on RA with exertion (walking test)  -Has not seen a pulmonologist, patient declined seeing a pulmonologist.  Has been using albuterol both nebulizer and handheld for management of symptoms.  Chronically short of breath, requesting work note not use stairs.  Reports she is tried Symbicort in the past with minimal success.  Will try Advair.  Patient will let me know in 2 weeks how her symptoms are.  Encourage patient to think about seeing a pulmonologist.  I do not believe her symptoms to be cardiac in nature at this time however if she develops any chest pain, worsening shortness of breath, chest pressure, palpitations, ETC she been informed to go to the emergency department and we will have her follow-up after discharge with cardiology. She declines seeing cardiology ahead of time and did not want an EKG today    Orders:    nebulizers misc; 1 each once daily. Tubing and mask    albuterol (Ventolin HFA) 90 mcg/actuation inhaler; Inhale 2 puffs every 4 hours if needed for wheezing or shortness of breath.    albuterol 2.5 mg /3 mL (0.083 %)  nebulizer solution; Take 3 mL (2.5 mg) by nebulization every 6 hours if needed for wheezing or shortness of breath.    fluticasone propion-salmeteroL (Advair Diskus) 100-50 mcg/dose diskus inhaler; Inhale 1 puff 2 times a day. Rinse mouth with water after use to reduce aftertaste and incidence of candidiasis. Do not swallow.    Primary hypertension  Controlled. Not on meds. Possibly from past alcohol use/anxiety       Medication refill  As above       Other hyperlipidemia         Transaminitis  See alcohol abuse  Orders:    Gamma GT; Future    Ethanol; Future    Lipid panel; Future    Need for hepatitis C screening test    Orders:    Hepatitis C antibody; Future    Screening for HIV (human immunodeficiency virus)    Orders:    HIV 1/2 Antigen/Antibody Screen with Reflex to Confirmation; Future    Screening for diabetes mellitus    Orders:    Hemoglobin A1C; Future    Tobacco use disorder         Annual physical exam  patient declines mammogram, cervical testing at this time.  She denies wanting to see an OB/GYN at this time.  -Patient declines colonoscopy.  -Patient declines antibody testing for hepatitis B, MMR, ETC.  -Patient accepts HIV/hepatitis C screening.  -Patient declines lung cancer screening at this time.  -Patient declines COVID/flu vaccination.  Patient accepts Shingrix vaccine.  - All labs/images noted in Objective portion reviewed by this provider at this appointment.  Orders:    Hepatitis C antibody; Future    HIV 1/2 Antigen/Antibody Screen with Reflex to Confirmation; Future    Zoster vaccine, Recombinant, Adult (SHINGRIX)    Lipid panel; Future      Orders Placed This Encounter   Procedures    Zoster vaccine, Recombinant, Adult (SHINGRIX)    Comprehensive metabolic panel     Standing Status:   Future     Number of Occurrences:   1     Standing Expiration Date:   4/1/2026     Order Specific Question:   Release result to [employer]     Answer:   Immediate [1]    Gamma GT     Standing Status:   Future      Number of Occurrences:   1     Standing Expiration Date:   4/1/2026     Order Specific Question:   Release result to MyChart     Answer:   Immediate [1]    Ethanol     Standing Status:   Future     Number of Occurrences:   1     Standing Expiration Date:   4/1/2026     Order Specific Question:   Release result to MyChart     Answer:   Immediate [1]    Tsh With Reflex To Free T4 If Abnormal     Standing Status:   Future     Number of Occurrences:   1     Standing Expiration Date:   4/1/2026     Order Specific Question:   Release result to MyChart     Answer:   Immediate [1]    Hepatitis C antibody     Standing Status:   Future     Number of Occurrences:   1     Standing Expiration Date:   4/1/2026     Order Specific Question:   Release result to MyChart     Answer:   Immediate [1]    HIV 1/2 Antigen/Antibody Screen with Reflex to Confirmation     Standing Status:   Future     Number of Occurrences:   1     Standing Expiration Date:   4/1/2026     Order Specific Question:   Release result to MyChart     Answer:   Immediate [1]    Hemoglobin A1C     Standing Status:   Future     Number of Occurrences:   1     Standing Expiration Date:   4/1/2026     Order Specific Question:   Release result to MyChart     Answer:   Immediate [1]    Lipid panel     Standing Status:   Future     Number of Occurrences:   1     Standing Expiration Date:   4/1/2026     Order Specific Question:   Release result to MyChart     Answer:   Immediate [1]    Vitamin B1, Whole Blood     Standing Status:   Future     Number of Occurrences:   1     Standing Expiration Date:   4/1/2026     Order Specific Question:   Release result to MyChart     Answer:   Immediate [1]       Follow up in two weeks by Prabha in two weeks to let me know how the new COPD medication is doing    Follow up in six months.    Emergency Department for any worsening symptoms.    n/a

## 2025-04-01 NOTE — ASSESSMENT & PLAN NOTE
Controlled per patient. Denies SI/HI. Refill meds. Check TSH  Orders:    Tsh With Reflex To Free T4 If Abnormal; Future    escitalopram (Lexapro) 20 mg tablet; Take 1 tablet (20 mg) by mouth once daily.    hydrOXYzine pamoate (VistariL) 50 mg capsule; Take 1 capsule (50 mg) by mouth 2 times a day as needed for anxiety.

## 2025-10-06 ENCOUNTER — APPOINTMENT (OUTPATIENT)
Dept: PRIMARY CARE | Facility: CLINIC | Age: 50
End: 2025-10-06
Payer: COMMERCIAL